# Patient Record
(demographics unavailable — no encounter records)

---

## 2024-12-11 NOTE — PAST MEDICAL HISTORY
[Normal Vaginal Route] : by normal vaginal route [None] : there were no delivery complications [Age Appropriate] : age appropriate developmental milestones met [de-identified] : 7 lb 11

## 2024-12-11 NOTE — HISTORY OF PRESENT ILLNESS
[FreeTextEntry2] : Mayank is a 10-year [id number] who is referred for second opinion for his growth.  Unfortunately, no records from the previous endocrinologist were available today.  According to dad who had taken Mayank to the visits, he had had a growth hormone stimulation test performed earlier during the course of his workup.  Dad thinks that he failed the growth hormone test.  An MRI was ordered however Mayank has not agreed to undergo the MRI so no therapy has been started  Mayank's records from the pediatrician indicate relatively steady growth at or below the fifth centile through childhood.  The growth records from the endocrinologist indicate that he grew 5.5 cm over a 15-month period.  Mayank had a bone age performed at age 10 years and 6 months, it was read as consistent with a bone age of 8, I read it as somewhat younger as between 7-8. Seen by Dr Art , now Dr Rodríguezwoody Talley is in general healthy child. The family brought in blood work from 2021 which indicated normal thyroid functions, normal CMP, prepubertal gonadotropins, normal hemoglobin A1c of 5%

## 2025-05-06 NOTE — PAST MEDICAL HISTORY
[Normal Vaginal Route] : by normal vaginal route [None] : there were no delivery complications [Age Appropriate] : age appropriate developmental milestones met [de-identified] : 7 lb 11

## 2025-05-06 NOTE — HISTORY OF PRESENT ILLNESS
[FreeTextEntry2] : Mayank is an 11 year -old who is followed for his growth. He presented for a second opinion in 12/24. Unfortunately, no records from the previous endocrinologist were available today.  According to dad who had taken Myaank to the visits, he had had a growth hormone stimulation test performed earlier during the course of his workup.  Dad thinks that he failed the growth hormone test.  An MRI was ordered however Mayank has not agreed to undergo the MRI so no therapy has been started  Mayank's records from the pediatrician indicate relatively steady growth at or below the fifth centile through childhood.  The growth records from the endocrinologist indicate that he grew 5.5 cm over a 15-month period.  Mayank had a bone age performed at age 10 years and 6 months, it was read as consistent with a bone age of 8, I read it as somewhat younger as between 7-8. Seen by Dr Art , now Dr Sophie Talley is in general healthy child. The family brought in blood work from 2021 which indicated normal thyroid functions, normal CMP, prepubertal gonadotropins, normal hemoglobin A1c of 5% At the time of the initial visit Mayank  was prepubertal  with height on the fourth centile and BMI on the 54th.   In clinic I discussed in detail with mom and dad over the phone that it is very important for them to forward the previous records to me for my review.  Mom had questions about whether Mayank was truly growth hormone deficient and I explained that growth hormone test in general are not always reproducible and therefore it is important to take the results of a growth hormone test as only part of the picture and take into account other factors such as growth rate, bone age and height prediction.  Of note, Mayank's height prediction today is close to his paternal height however it is true that if he is actually growth hormones deficient he is likely not to reach this height. Mayank has been well, he has not needed to see the pediatrician.  The family has not as yet forwarded the records of the growth hormone stimulation test.